# Patient Record
Sex: MALE | Race: OTHER | Employment: OTHER | URBAN - METROPOLITAN AREA
[De-identification: names, ages, dates, MRNs, and addresses within clinical notes are randomized per-mention and may not be internally consistent; named-entity substitution may affect disease eponyms.]

---

## 2021-03-10 ENCOUNTER — HOSPITAL ENCOUNTER (OUTPATIENT)
Dept: RADIOLOGY | Facility: HOSPITAL | Age: 51
Discharge: HOME | End: 2021-03-10
Attending: PHYSICIAN ASSISTANT
Payer: MEDICAID

## 2021-03-10 ENCOUNTER — OFFICE VISIT (OUTPATIENT)
Dept: ORTHOPEDICS | Facility: CLINIC | Age: 51
End: 2021-03-10
Payer: MEDICAID

## 2021-03-10 VITALS
WEIGHT: 199.63 LBS | HEART RATE: 64 BPM | DIASTOLIC BLOOD PRESSURE: 84 MMHG | BODY MASS INDEX: 30.35 KG/M2 | SYSTOLIC BLOOD PRESSURE: 122 MMHG

## 2021-03-10 DIAGNOSIS — M25.512 BILATERAL SHOULDER PAIN, UNSPECIFIED CHRONICITY: ICD-10-CM

## 2021-03-10 DIAGNOSIS — M25.512 BILATERAL SHOULDER PAIN, UNSPECIFIED CHRONICITY: Primary | ICD-10-CM

## 2021-03-10 DIAGNOSIS — M25.511 BILATERAL SHOULDER PAIN, UNSPECIFIED CHRONICITY: Primary | ICD-10-CM

## 2021-03-10 DIAGNOSIS — M25.511 BILATERAL SHOULDER PAIN, UNSPECIFIED CHRONICITY: ICD-10-CM

## 2021-03-10 DIAGNOSIS — M75.41 SUBACROMIAL IMPINGEMENT OF RIGHT SHOULDER: Primary | ICD-10-CM

## 2021-03-10 DIAGNOSIS — M75.42 SUBACROMIAL IMPINGEMENT OF LEFT SHOULDER: ICD-10-CM

## 2021-03-10 PROCEDURE — 73030 X-RAY EXAM OF SHOULDER: CPT | Mod: TC,50,FY

## 2021-03-10 PROCEDURE — 73030 XR SHOULDER COMPLETE 2 OR MORE VIEWS BILATERAL: ICD-10-PCS | Mod: 26,50,, | Performed by: RADIOLOGY

## 2021-03-10 PROCEDURE — 99203 OFFICE O/P NEW LOW 30 MIN: CPT | Mod: PBBFAC,25,PN | Performed by: PHYSICIAN ASSISTANT

## 2021-03-10 PROCEDURE — 20610 DRAIN/INJ JOINT/BURSA W/O US: CPT | Mod: PBBFAC,PN | Performed by: PHYSICIAN ASSISTANT

## 2021-03-10 PROCEDURE — 99999 PR PBB SHADOW E&M-NEW PATIENT-LVL III: CPT | Mod: PBBFAC,,, | Performed by: PHYSICIAN ASSISTANT

## 2021-03-10 PROCEDURE — 73030 X-RAY EXAM OF SHOULDER: CPT | Mod: 26,50,, | Performed by: RADIOLOGY

## 2021-03-10 PROCEDURE — 99204 PR OFFICE/OUTPT VISIT, NEW, LEVL IV, 45-59 MIN: ICD-10-PCS | Mod: S$PBB,25,, | Performed by: PHYSICIAN ASSISTANT

## 2021-03-10 PROCEDURE — 20610 LARGE JOINT ASPIRATION/INJECTION: R SUBACROMIAL BURSA: ICD-10-PCS | Mod: S$PBB,RT,, | Performed by: PHYSICIAN ASSISTANT

## 2021-03-10 PROCEDURE — 99999 PR PBB SHADOW E&M-NEW PATIENT-LVL III: ICD-10-PCS | Mod: PBBFAC,,, | Performed by: PHYSICIAN ASSISTANT

## 2021-03-10 PROCEDURE — 99204 OFFICE O/P NEW MOD 45 MIN: CPT | Mod: S$PBB,25,, | Performed by: PHYSICIAN ASSISTANT

## 2021-03-10 PROCEDURE — 20610 DRAIN/INJ JOINT/BURSA W/O US: CPT | Mod: S$PBB,RT,, | Performed by: PHYSICIAN ASSISTANT

## 2021-03-10 RX ORDER — TRIAMCINOLONE ACETONIDE 40 MG/ML
40 INJECTION, SUSPENSION INTRA-ARTICULAR; INTRAMUSCULAR
Status: DISCONTINUED | OUTPATIENT
Start: 2021-03-10 | End: 2021-03-10 | Stop reason: HOSPADM

## 2021-03-10 RX ADMIN — TRIAMCINOLONE ACETONIDE 40 MG: 40 INJECTION, SUSPENSION INTRA-ARTICULAR; INTRAMUSCULAR at 10:03

## 2021-03-28 ENCOUNTER — IMMUNIZATION (OUTPATIENT)
Dept: INTERNAL MEDICINE | Facility: CLINIC | Age: 51
End: 2021-03-28
Payer: MEDICAID

## 2021-03-28 DIAGNOSIS — Z23 NEED FOR VACCINATION: Primary | ICD-10-CM

## 2021-03-28 PROCEDURE — 0011A COVID-19, MRNA, LNP-S, PF, 100 MCG/0.5 ML DOSE VACCINE: CPT | Mod: PBBFAC,PO

## 2021-04-25 ENCOUNTER — IMMUNIZATION (OUTPATIENT)
Dept: INTERNAL MEDICINE | Facility: CLINIC | Age: 51
End: 2021-04-25
Payer: MEDICAID

## 2021-04-25 DIAGNOSIS — Z23 NEED FOR VACCINATION: Primary | ICD-10-CM

## 2021-04-25 PROCEDURE — 0012A COVID-19, MRNA, LNP-S, PF, 100 MCG/0.5 ML DOSE VACCINE: CPT | Mod: PBBFAC,PO

## 2021-06-28 ENCOUNTER — OFFICE VISIT (OUTPATIENT)
Dept: URGENT CARE | Facility: CLINIC | Age: 51
End: 2021-06-28
Payer: MEDICAID

## 2021-06-28 VITALS
RESPIRATION RATE: 16 BRPM | HEART RATE: 59 BPM | BODY MASS INDEX: 30.16 KG/M2 | WEIGHT: 199 LBS | OXYGEN SATURATION: 97 % | TEMPERATURE: 98 F | HEIGHT: 68 IN | DIASTOLIC BLOOD PRESSURE: 82 MMHG | SYSTOLIC BLOOD PRESSURE: 116 MMHG

## 2021-06-28 DIAGNOSIS — H10.32 ACUTE BACTERIAL CONJUNCTIVITIS OF LEFT EYE: Primary | ICD-10-CM

## 2021-06-28 PROCEDURE — 99213 OFFICE O/P EST LOW 20 MIN: CPT | Mod: S$GLB,,, | Performed by: PHYSICIAN ASSISTANT

## 2021-06-28 PROCEDURE — 99213 PR OFFICE/OUTPT VISIT, EST, LEVL III, 20-29 MIN: ICD-10-PCS | Mod: S$GLB,,, | Performed by: PHYSICIAN ASSISTANT

## 2021-06-28 RX ORDER — POLYMYXIN B SULFATE AND TRIMETHOPRIM 1; 10000 MG/ML; [USP'U]/ML
1 SOLUTION OPHTHALMIC EVERY 6 HOURS
Qty: 10 ML | Refills: 0 | Status: SHIPPED | OUTPATIENT
Start: 2021-06-28 | End: 2021-07-05

## 2021-06-28 RX ORDER — SODIUM, POTASSIUM,MAG SULFATES 17.5-3.13G
SOLUTION, RECONSTITUTED, ORAL ORAL
COMMUNITY
Start: 2021-04-14

## 2021-06-29 ENCOUNTER — CLINICAL SUPPORT (OUTPATIENT)
Dept: URGENT CARE | Facility: CLINIC | Age: 51
End: 2021-06-29
Payer: MEDICAID

## 2021-06-29 DIAGNOSIS — Z11.52 ENCOUNTER FOR SCREENING FOR COVID-19: Primary | ICD-10-CM

## 2021-06-29 LAB
CTP QC/QA: YES
SARS-COV-2 RDRP RESP QL NAA+PROBE: NEGATIVE

## 2021-06-29 PROCEDURE — 87635: ICD-10-PCS | Mod: QW,S$GLB,, | Performed by: PHYSICIAN ASSISTANT

## 2021-06-29 PROCEDURE — 87635 SARS-COV-2 COVID-19 AMP PRB: CPT | Mod: QW,S$GLB,, | Performed by: PHYSICIAN ASSISTANT

## 2021-12-23 ENCOUNTER — HOSPITAL ENCOUNTER (EMERGENCY)
Facility: HOSPITAL | Age: 51
Discharge: HOME | End: 2021-12-23
Attending: EMERGENCY MEDICINE
Payer: MEDICAID

## 2021-12-23 VITALS
OXYGEN SATURATION: 98 % | WEIGHT: 172 LBS | DIASTOLIC BLOOD PRESSURE: 71 MMHG | SYSTOLIC BLOOD PRESSURE: 118 MMHG | TEMPERATURE: 98 F | HEART RATE: 68 BPM | BODY MASS INDEX: 26.15 KG/M2 | RESPIRATION RATE: 18 BRPM
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
## 2021-12-23 DIAGNOSIS — S41.151A DOG BITE OF ARM, RIGHT, INITIAL ENCOUNTER: Primary | ICD-10-CM
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
## 2021-12-23 PROCEDURE — 99284 EMERGENCY DEPT VISIT MOD MDM: CPT | Mod: 25

## 2021-12-23 PROCEDURE — 90715 TDAP VACCINE 7 YRS/> IM: CPT | Performed by: NURSE PRACTITIONER

## 2021-12-23 PROCEDURE — 63600175 PHARM REV CODE 636 W HCPCS: Performed by: NURSE PRACTITIONER

## 2021-12-23 PROCEDURE — 90471 IMMUNIZATION ADMIN: CPT | Performed by: NURSE PRACTITIONER

## 2021-12-23 RX ADMIN — TETANUS TOXOID, REDUCED DIPHTHERIA TOXOID AND ACELLULAR PERTUSSIS VACCINE, ADSORBED 0.5 ML: 5; 2.5; 8; 8; 2.5 SUSPENSION INTRAMUSCULAR at 06:12

## 2021-12-23 NOTE — FIRST PROVIDER EVALUATION
Emergency Department TeleTriage Encounter Note      CHIEF COMPLAINT    Chief Complaint   Patient presents with    Animal Bite     Dog bite noted to right posterior arm, + abrasion noted, last tetanus  was 2005, bleeding controled        VITAL SIGNS   Initial Vitals [12/23/21 1643]   BP Pulse Resp Temp SpO2   118/71 68 18 98 °F (36.7 °C) 98 %      MAP       --            ALLERGIES    Review of patient's allergies indicates:  No Known Allergies    PROVIDER TRIAGE NOTE  TeleTriage Note: Bethany Figueroa, a nontoxic/well appearing, 51 y.o. male, presented to the ED with c/o dog bite to right forearm that happened around 2 hours ago.     All ED beds are full at present; patient notified of this status.  Patient seen and medically screened by Nurse Practitioner via teletriage. Orders initiated at triage to expedite care.  Patient is stable to return to the waiting room and will be placed in an ED bed when available.  Care will be transferred to an alternate provider when patient has been placed in an Exam Room from the lobby for physical exam, additional orders, and disposition.  5:25 PM Madie Gomez DNP, FNP-C        ORDERS  Labs Reviewed - No data to display    ED Orders (720h ago, onward)    Start Ordered     Status Ordering Provider    12/23/21 1730 12/23/21 1726  Tdap (BOOSTRIX) vaccine injection 0.5 mL  ED 1 Time         Ordered MADIE GOMEZ    12/23/21 1727 12/23/21 1726  X-Ray Elbow Complete Right  1 time imaging         Ordered MADIE GOMEZ            Virtual Visit Note: The provider triage portion of this emergency department evaluation and documentation was performed via [employer], a HIPAA-compliant telemedicine application, in concert with a tele-presenter in the room. A face to face patient evaluation with one of my colleagues will occur once the patient is placed in an emergency department room.      DISCLAIMER: This note was prepared with M*Modal voice recognition transcription  software. Garbled syntax, mangled pronouns, and other bizarre constructions may be attributed to that software system.

## 2021-12-24 NOTE — ED PROVIDER NOTES
Encounter Date: 12/23/2021    SCRIBE #1 NOTE: I, Ruby Boston , am scribing for, and in the presence of, Shaylee Puri MD.       History     Chief Complaint   Patient presents with    Animal Bite     Dog bite noted to right posterior arm, + abrasion noted, last tetanus  was 2005, bleeding controled      51-year-old male presents to the ED due to a dog bite.  Patient reports he was fixing a fence at a client's house when their domestic dog ran after him and bit him on the right posterior arm near his elbow. He notes he was able to to escape. Patient reports the dog's owners verbally mentioned the dog was up-to-date on vaccinations but did not show any documentation to prove it. Patient notes he has received his Tetanus vaccination.         The history is provided by the patient.     Review of patient's allergies indicates:  No Known Allergies  History reviewed. No pertinent past medical history.  No past surgical history on file.  No family history on file.  Social History     Tobacco Use    Smoking status: Former Smoker     Types: Cigarettes   Substance Use Topics    Alcohol use: Never    Drug use: Never     Review of Systems   Constitutional: Negative for fever.   HENT: Negative for sore throat.    Respiratory: Negative for shortness of breath.    Cardiovascular: Negative for chest pain.   Gastrointestinal: Negative for nausea.   Genitourinary: Negative for dysuria.   Musculoskeletal: Negative for back pain.   Skin: Positive for wound (right arm). Negative for rash.   Neurological: Negative for weakness.   Hematological: Does not bruise/bleed easily.       Physical Exam     Initial Vitals [12/23/21 1643]   BP Pulse Resp Temp SpO2   118/71 68 18 98 °F (36.7 °C) 98 %      MAP       --         Physical Exam    Nursing note and vitals reviewed.  Constitutional: He appears well-developed and well-nourished.   HENT:   Head: Normocephalic and atraumatic.   Neck: Neck supple.   Normal range of motion.  Pulmonary/Chest:  Breath sounds normal.   Musculoskeletal:         General: No edema. Normal range of motion.      Cervical back: Normal range of motion and neck supple.     Neurological: He is alert and oriented to person, place, and time.   Skin: Skin is warm and dry.        Psychiatric: He has a normal mood and affect. His behavior is normal. Judgment and thought content normal.         ED Course   Procedures  Labs Reviewed - No data to display       Imaging Results          X-Ray Elbow Complete Right (Final result)  Result time 12/23/21 17:53:28    Final result by Alejandro Hernandez MD (12/23/21 17:53:28)                 Impression:      Negative right elbow.      Electronically signed by: Alejandro Hernandez  Date:    12/23/2021  Time:    17:53             Narrative:    EXAMINATION:  XR ELBOW COMPLETE 3 VIEW RIGHT    CLINICAL HISTORY:  . Bitten by dog, initial encounter    TECHNIQUE:  AP, lateral, and oblique views of the right elbow were performed.    COMPARISON:  None    FINDINGS:  Bones, joint spaces and soft tissues appear intact without evidence of fracture, foreign body or effusion.                                 Medications   Tdap (BOOSTRIX) vaccine injection 0.5 mL (0.5 mLs Intramuscular Given 12/23/21 1826)     Medical Decision Making:   Clinical Tests:   Radiological Study: Ordered and Reviewed          Scribe Attestation:   Scribe #1: I performed the above scribed service and the documentation accurately describes the services I performed. I attest to the accuracy of the note.        ED Course as of 12/23/21 2024   Thu Dec 23, 2021   2022 The patient was given rabies vaccine instructions to read.  He decided that he does not want to start the rabies vaccine tonight but he would like to see if the dog has been vaccinated.  He was informed that he can return tomorrow. [ST]      ED Course User Index  [ST] Shaylee Puri MD             Clinical Impression:   Final diagnoses:  [W54.0XXA] Dog bite  [S41.151A, W54.0XXA] Dog  bite of arm, right, initial encounter (Primary)          ED Disposition Condition    Discharge Stable        ED Prescriptions     None        Follow-up Information     Follow up With Specialties Details Why Contact Info    Adrián Rivera MD Family Medicine Schedule an appointment as soon as possible for a visit   [address]  [facility] [address]  [phone]      [facility] Emergency Dept Emergency Medicine  If you find that the dog has had no vaccinations and you would like your rabies vaccination [address] [facility] [address] [phone]           I, Shaylee Puri, personally performed the services described in this documentation. All medical record entries made by the scribe were at my direction and in my presence.  I have reviewed the chart and agree that the record reflects my personal performance and is accurate and complete. Shaylee Puri M.D. 8:24 PM12/23/2021     Shaylee Puri MD  12/23/21 2024

## 2021-12-24 NOTE — ED NOTES
Pt presents to the ED c/o superficial dog bite to Rt lateral elbow    APPEARANCE: Alert, oriented and in no acute distress.  HEENT: Speaks without hoarseness.  CARDIAC: Normal rate and rhythm.    PERIPHERAL VASCULAR: peripheral pulses present. Normal cap refill. No edema. Warm to touch.    RESPIRATORY:Normal rate and effort. Respirations are equal and unlabored no obvious signs of distress.  GASTRO: soft, nondistended, nontender. Denies nausea, vomiting, or diarrhea.  : voids spontaneously and without difficulty.   MUSC: Full ROM. No obvious deformity. Ambulatory with a steady gait  SKIN: Skin is warm and dry, with bruising and abrasion. Mucous membranes moist.  NEURO: Pt is awake, alert, aware of environment. No neurologic deficits noted.

## 2021-12-26 ENCOUNTER — HOSPITAL ENCOUNTER (EMERGENCY)
Facility: HOSPITAL | Age: 51
Discharge: HOME | End: 2021-12-26
Attending: EMERGENCY MEDICINE
Payer: MEDICAID

## 2021-12-26 VITALS
RESPIRATION RATE: 16 BRPM | OXYGEN SATURATION: 100 % | TEMPERATURE: 99 F | SYSTOLIC BLOOD PRESSURE: 115 MMHG | HEART RATE: 73 BPM | DIASTOLIC BLOOD PRESSURE: 74 MMHG
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
## 2021-12-26 PROCEDURE — 63600175 PHARM REV CODE 636 W HCPCS: Mod: JG | Performed by: EMERGENCY MEDICINE

## 2021-12-26 PROCEDURE — 90675 RABIES VACCINE IM: CPT | Mod: JG | Performed by: EMERGENCY MEDICINE

## 2021-12-26 PROCEDURE — 99284 EMERGENCY DEPT VISIT MOD MDM: CPT | Mod: 25

## 2021-12-26 PROCEDURE — 90471 IMMUNIZATION ADMIN: CPT | Performed by: EMERGENCY MEDICINE

## 2021-12-26 PROCEDURE — 90471 IMMUNIZATION ADMIN: CPT

## 2021-12-26 RX ORDER — AMOXICILLIN AND CLAVULANATE POTASSIUM 875; 125 MG/1; MG/1
1 TABLET, FILM COATED ORAL 2 TIMES DAILY
Qty: 14 TABLET | Refills: 0 | Status: SHIPPED | OUTPATIENT
Start: 2021-12-26 | End: 2023-02-08

## 2021-12-26 RX ADMIN — RABIES VACCINE 2.5 UNITS: KIT at 01:12

## 2021-12-26 NOTE — ED PROVIDER NOTES
Encounter Date: 12/26/2021    SCRIBE #1 NOTE: I, Nasim Danielson am scribing for, and in the presence of, Td Bansal MD.       History     Chief Complaint   Patient presents with    rabies shot     Pt received dog bite  occurred 12/23, request rabies shot      Bethany Figueroa is a 51 y.o. male with no past medical history on file who presents to the ED for Rabies Shot.   The patient reports being bitten on the right arm by a neighbor's Rottweiler on 12/23. He reports being seen in the ED following the incident and received a tetanus vaccine but was not placed on antibiotics. He states that the dog's owners were unable to provide documentation that the dog has received all of his immunizations and is requesting a rabies vaccine. He denies fever or other associated symptoms.    The history is provided by the patient.     Review of patient's allergies indicates:  No Known Allergies  No past medical history on file.  No past surgical history on file.  No family history on file.  Social History     Tobacco Use    Smoking status: Former Smoker     Types: Cigarettes   Substance Use Topics    Alcohol use: Never    Drug use: Never     Review of Systems   Constitutional: Negative for chills and fever.   HENT: Negative for sore throat.    Eyes: Negative for redness.   Respiratory: Negative for shortness of breath.    Cardiovascular: Negative for chest pain.   Gastrointestinal: Negative for abdominal pain, diarrhea, nausea and vomiting.   Genitourinary: Negative for dysuria and hematuria.   Musculoskeletal: Negative for back pain.   Skin: Positive for wound (dog bite to Right arm). Negative for rash.   Neurological: Negative for headaches.   All other systems reviewed and are negative.      Physical Exam     Initial Vitals [12/26/21 1130]   BP Pulse Resp Temp SpO2   110/65 79 18 98.1 °F (36.7 °C) 99 %      MAP       --         Physical Exam    Nursing note and vitals reviewed.  Constitutional: He appears  well-developed and well-nourished. He is not diaphoretic. No distress.   HENT:   Head: Normocephalic and atraumatic.   Mouth/Throat: Oropharynx is clear and moist.   Eyes: Conjunctivae are normal.   Cardiovascular: Normal rate and regular rhythm.   Pulmonary/Chest: Breath sounds normal. No respiratory distress.     Neurological: He is alert and oriented to person, place, and time.   Skin: Skin is warm and dry. Capillary refill takes less than 2 seconds. Abrasion noted. No rash noted. There is erythema. No pallor.   Linear abrasions with 3 puncture wounds noted proximal to right elbow. Mild surrounding erythema noted. No discharge or bleeding noted.   Psychiatric: He has a normal mood and affect.         ED Course   Procedures  Labs Reviewed - No data to display       Imaging Results    None          Medications   rabies vaccine, PCEC injection 2.5 Units (2.5 Units Intramuscular Given 12/26/21 1330)     Medical Decision Making:   Initial Assessment:   The patient is a 51 y.o. male who presents to the ED for a rabies shot.   ED Management:  Patient was given his 1st rabies shot here in the ED.  He will return in 1 week for his 2nd shot.  He will also be placed on Augmentin.  He may return to the ED sooner for any developing signs of infection.          Scribe Attestation:   Scribe #1: I performed the above scribed service and the documentation accurately describes the services I performed. I attest to the accuracy of the note.                 Clinical Impression:   Final diagnoses:  [W54.0XXD] Dog bite, subsequent encounter (Primary)          ED Disposition Condition    Discharge Stable       I, Dr. Td Douglas, personally performed the services described in this documentation. All medical record entries made by the scribe were at my direction and in my presence. I have reviewed the chart and agree that the record reflects my personal performance and is accurate and complete. Td Douglas MD.  5:32 PM  12/28/2021    ED Prescriptions     Medication Sig Dispense Start Date End Date Auth. Provider    amoxicillin-clavulanate 875-125mg (AUGMENTIN) 875-125 mg per tablet Take 1 tablet by mouth 2 (two) times daily. 14 tablet 12/26/2021  Td Bansal MD        Follow-up Information     Follow up With Specialties Details Why Contact Info    [facility] - Emergency Dept Emergency Medicine In 1 week for second injection [address] [facility] [address] [phone]           Td Bansal MD  12/28/21 3519

## 2021-12-27 ENCOUNTER — NURSE TRIAGE (OUTPATIENT)
Dept: ADMINISTRATIVE | Facility: CLINIC | Age: 51
End: 2021-12-27
Payer: MEDICAID

## 2022-01-02 ENCOUNTER — HOSPITAL ENCOUNTER (EMERGENCY)
Facility: HOSPITAL | Age: 52
Discharge: HOME | End: 2022-01-02
Attending: EMERGENCY MEDICINE
Payer: MEDICAID

## 2022-01-02 VITALS
HEART RATE: 60 BPM | OXYGEN SATURATION: 95 % | TEMPERATURE: 97 F | SYSTOLIC BLOOD PRESSURE: 145 MMHG | DIASTOLIC BLOOD PRESSURE: 76 MMHG | RESPIRATION RATE: 20 BRPM

## 2022-01-02 DIAGNOSIS — Z20.3 NEED FOR POST EXPOSURE PROPHYLAXIS FOR RABIES: Primary | ICD-10-CM

## 2022-01-02 LAB
CTP QC/QA: YES
SARS-COV-2 RDRP RESP QL NAA+PROBE: NEGATIVE

## 2022-01-02 PROCEDURE — 90471 IMMUNIZATION ADMIN: CPT | Performed by: EMERGENCY MEDICINE

## 2022-01-02 PROCEDURE — 90675 RABIES VACCINE IM: CPT | Mod: JG | Performed by: EMERGENCY MEDICINE

## 2022-01-02 PROCEDURE — U0002 COVID-19 LAB TEST NON-CDC: HCPCS | Performed by: EMERGENCY MEDICINE

## 2022-01-02 PROCEDURE — 99284 EMERGENCY DEPT VISIT MOD MDM: CPT | Mod: 25

## 2022-01-02 PROCEDURE — 63600175 PHARM REV CODE 636 W HCPCS: Mod: JG | Performed by: EMERGENCY MEDICINE

## 2022-01-02 RX ADMIN — RABIES VACCINE 2.5 UNITS: KIT at 06:01

## 2022-01-02 NOTE — DISCHARGE INSTRUCTIONS
COME TO THE EMERGENCY DEPARTMENT FOR YOUR NEXT RABIES SHOTS ON:    JANUARY 10TH AFTER 5 PM. CHECK IN AT THE REGISTRATION DESK AND ASK THE  TO NOTIFY DR. KHAN THAT YOU ARE HERE.     JANUARY 16TH AFTER 5 PM. CHECK IN AT THE REGISTRATION DESK AND ASK THE  TO NOTIFY DR. KHAN THAT YOU ARE HERE.

## 2022-01-02 NOTE — ED PROVIDER NOTES
Encounter Date: 1/2/2022       History     Chief Complaint   Patient presents with    rabies shot     Request 2nd rabies shot      The patient is a 51-year-old male who was bitten by a dog 10 days ago..  It was a next door neighbors rottweiler.  The family was unable to produce the rabies vaccination records so the patient came in 3 days after the dog bite to start his rabies vaccinations.  He had his 1st vaccination at that time.  He was given Augmentin which have made him nauseated and he has only taken 1 dose however he states his arm has healed.        Review of patient's allergies indicates:  No Known Allergies  No past medical history on file.  No past surgical history on file.  No family history on file.  Social History     Tobacco Use    Smoking status: Former Smoker     Types: Cigarettes   Substance Use Topics    Alcohol use: Never    Drug use: Never     Review of Systems   Constitutional: Negative for activity change, appetite change and fever.   Skin: Positive for wound (Wound is healing).       Physical Exam     Initial Vitals [01/02/22 1514]   BP Pulse Resp Temp SpO2   125/67 74 18 98.7 °F (37.1 °C) 99 %      MAP       --         Physical Exam    Nursing note and vitals reviewed.  Constitutional: He appears well-developed and well-nourished.     Neurological: He is alert and oriented to person, place, and time.   Skin: Skin is warm and dry.   Wound to right upper arm is is scarred over with no evidence of infection         ED Course   Procedures  Labs Reviewed   SARS-COV-2 RDRP GENE          Imaging Results    None          Medications   rabies vaccine, PCEC injection 2.5 Units (has no administration in time range)                 ED Course as of 01/02/22 1736   Sun Jan 02, 2022   1734 The patient is out of rotation for his rabies vaccinations.  He has received day 1 of his immunization but did not receive day 3 of his immunizations.  Today would be his day 7 immunization but I feel that he should  get two more vaccinations to complete the course. I will tell him to come back in 7 days and then in another 7 days for 4 shots total. [ST]   8442 COVID test was ordered by me on the wrong patient. It was sent to the lab and will be run.  [ST]      ED Course User Index  [ST] Shaylee Puri MD             Clinical Impression:   Final diagnoses:  [Z20.3] Need for post exposure prophylaxis for rabies (Primary)          ED Disposition Condition    Discharge Stable        ED Prescriptions     None        Follow-up Information     Follow up With Specialties Details Why Contact Info    Adrián Rivera MD Family Medicine In 1 week  [address] [facility] [address]  [phone]             Shaylee Puri MD  01/02/22 7109

## 2022-01-03 ENCOUNTER — PATIENT MESSAGE (OUTPATIENT)
Dept: ADMINISTRATIVE | Facility: OTHER | Age: 52
End: 2022-01-03
Payer: MEDICAID

## 2022-01-10 ENCOUNTER — HOSPITAL ENCOUNTER (EMERGENCY)
Facility: HOSPITAL | Age: 52
Discharge: HOME | End: 2022-01-10
Attending: EMERGENCY MEDICINE
Payer: MEDICAID

## 2022-01-10 VITALS
TEMPERATURE: 98 F | OXYGEN SATURATION: 100 % | SYSTOLIC BLOOD PRESSURE: 120 MMHG | HEART RATE: 100 BPM | RESPIRATION RATE: 18 BRPM | DIASTOLIC BLOOD PRESSURE: 78 MMHG

## 2022-01-10 DIAGNOSIS — Z23 NEED FOR RABIES VACCINATION: Primary | ICD-10-CM

## 2022-01-10 PROCEDURE — 63600175 PHARM REV CODE 636 W HCPCS: Mod: JG | Performed by: EMERGENCY MEDICINE

## 2022-01-10 PROCEDURE — 99900062 HC AFTER HR RABIES VACCINE ED REGISTRATION

## 2022-01-10 PROCEDURE — 90471 IMMUNIZATION ADMIN: CPT | Performed by: EMERGENCY MEDICINE

## 2022-01-10 PROCEDURE — 99999 HC NO LEVEL OF SERVICE - ED ONLY: CPT

## 2022-01-10 PROCEDURE — 90675 RABIES VACCINE IM: CPT | Mod: JG | Performed by: EMERGENCY MEDICINE

## 2022-01-10 RX ADMIN — RABIES VACCINE 2.5 UNITS: KIT at 05:01

## 2022-01-10 NOTE — FIRST PROVIDER EVALUATION
Emergency Department TeleTriage Encounter Note      CHIEF COMPLAINT    Chief Complaint   Patient presents with    rabies injection     Patient is returning for third injection of rabies.        VITAL SIGNS   Initial Vitals [01/10/22 1658]   BP Pulse Resp Temp SpO2   122/87 106 20 98 °F (36.7 °C) 100 %      MAP       --            ALLERGIES    Review of patient's allergies indicates:  No Known Allergies    PROVIDER TRIAGE NOTE  This is a teletriage evaluation of a 51 y.o. male presenting to the ED complaining of need for 3rd rabies vaccine.     Initial orders will be placed and care will be transferred to an alternate provider when patient is roomed for a full evaluation. Any additional orders and the final disposition will be determined by that provider.           ORDERS  Labs Reviewed - No data to display    ED Orders (720h ago, onward)    Start Ordered     Status Ordering Provider    01/10/22 1800 01/10/22 1700  rabies vaccine, PCEC injection 2.5 Units  Once         Ordered YARON DONAHUE    01/10/22 1715 01/10/22 1704  rabies vaccine,human diploid injection 2.5 Units  ED 1 Time         Ordered DIANE OCHOA            Virtual Visit Note: The provider triage portion of this emergency department evaluation and documentation was performed via [employer], a HIPAA-compliant telemedicine application, in concert with a tele-presenter in the room. A face to face patient evaluation with one of my colleagues will occur once the patient is placed in an emergency department room.      DISCLAIMER: This note was prepared with [employer] voice recognition transcription software. Garbled syntax, mangled pronouns, and other bizarre constructions may be attributed to that software system.

## 2022-01-11 NOTE — ED NOTES
Pt was bit by a dog and presents for his 3rd rabies shot. Pt reports no difficulty with the first 2. Pt aaox4 with no distress.

## 2022-01-11 NOTE — DISCHARGE INSTRUCTIONS
The patient's instructions from last week include instructions for today. He will return for 1 final shot next week.

## 2022-01-11 NOTE — ED PROVIDER NOTES
Encounter Date: 1/10/2022       History     Chief Complaint   Patient presents with    rabies injection     Patient is returning for third injection of rabies.      Patient is a 51-year-old male who presents to the emergency department for his 3rd out of 4 rabies vaccinations.  The patient was bitten by a neighborhood dog.        Review of patient's allergies indicates:  No Known Allergies  No past medical history on file.  No past surgical history on file.  No family history on file.  Social History     Tobacco Use    Smoking status: Former Smoker     Types: Cigarettes   Substance Use Topics    Alcohol use: Never    Drug use: Never     Review of Systems   Constitutional: Negative for activity change, appetite change and fever.       Physical Exam     Initial Vitals [01/10/22 1658]   BP Pulse Resp Temp SpO2   122/87 106 20 98 °F (36.7 °C) 100 %      MAP       --         Physical Exam    Nursing note and vitals reviewed.  Constitutional: He appears well-developed and well-nourished.     Skin: Skin is warm and dry.   Bite to right upper arm as well healed         ED Course   Procedures  Labs Reviewed - No data to display       Imaging Results    None          Medications   rabies vaccine, PCEC injection 2.5 Units (2.5 Units Intramuscular Given 1/10/22 1740)                          Clinical Impression:   Final diagnoses:  [Z23] Need for rabies vaccination (Primary)          ED Disposition Condition    Discharge Stable        ED Prescriptions     None        Follow-up Information    None          Shaylee Puri MD  01/10/22 3446

## 2022-01-16 ENCOUNTER — HOSPITAL ENCOUNTER (EMERGENCY)
Facility: HOSPITAL | Age: 52
Discharge: HOME | End: 2022-01-16
Attending: EMERGENCY MEDICINE
Payer: MEDICAID

## 2022-01-16 VITALS
OXYGEN SATURATION: 95 % | DIASTOLIC BLOOD PRESSURE: 81 MMHG | WEIGHT: 173 LBS | SYSTOLIC BLOOD PRESSURE: 121 MMHG | RESPIRATION RATE: 17 BRPM | HEART RATE: 90 BPM | TEMPERATURE: 98 F | BODY MASS INDEX: 26.3 KG/M2

## 2022-01-16 DIAGNOSIS — Z23 ENCOUNTER FOR REPEAT ADMINISTRATION OF RABIES VACCINATION: Primary | ICD-10-CM

## 2022-01-16 PROCEDURE — 90471 IMMUNIZATION ADMIN: CPT | Performed by: EMERGENCY MEDICINE

## 2022-01-16 PROCEDURE — 63600175 PHARM REV CODE 636 W HCPCS: Mod: JG | Performed by: EMERGENCY MEDICINE

## 2022-01-16 PROCEDURE — 99999 HC NO LEVEL OF SERVICE - ED ONLY: CPT

## 2022-01-16 PROCEDURE — 90675 RABIES VACCINE IM: CPT | Mod: JG | Performed by: EMERGENCY MEDICINE

## 2022-01-16 PROCEDURE — 99900062 HC AFTER HR RABIES VACCINE ED REGISTRATION

## 2022-01-16 RX ADMIN — Medication 2.5 UNITS: at 09:01

## 2022-01-17 NOTE — ED PROVIDER NOTES
Encounter Date: 1/16/2022       History     Chief Complaint   Patient presents with    rabies vaccine     Pt arrives for his 5th rabies vaccine.      The patient presents to the emergency department for his 4th and final rabies vaccine.  Bite wound is well-healed.  Patient denies any other complaints        Review of patient's allergies indicates:  No Known Allergies  No past medical history on file.  No past surgical history on file.  No family history on file.  Social History     Tobacco Use    Smoking status: Former Smoker     Types: Cigarettes   Substance Use Topics    Alcohol use: Never    Drug use: Never     Review of Systems   Constitutional: Negative for activity change, appetite change and fever.       Physical Exam     Initial Vitals [01/16/22 2032]   BP Pulse Resp Temp SpO2   121/81 90 17 98.3 °F (36.8 °C) 95 %      MAP       --         Physical Exam    Nursing note and vitals reviewed.  Constitutional: He appears well-developed and well-nourished.     Neurological: He is alert and oriented to person, place, and time.   Psychiatric: He has a normal mood and affect. His behavior is normal. Judgment and thought content normal.         ED Course   Procedures  Labs Reviewed - No data to display       Imaging Results    None          Medications   rabies vaccine, PCEC injection 2.5 Units (has no administration in time range)                          Clinical Impression:   Final diagnoses:  [Z23] Encounter for repeat administration of rabies vaccination (Primary)          ED Disposition Condition    Discharge Stable        ED Prescriptions     None        Follow-up Information     Follow up With Specialties Details Why Contact Info    Adrián Rivera MD Family Medicine  As needed [address] [facility] [address]  [phone]             Shaylee Puri MD  01/16/22 2059

## 2023-02-08 ENCOUNTER — OFFICE VISIT (OUTPATIENT)
Dept: URGENT CARE | Facility: CLINIC | Age: 53
End: 2023-02-08
Payer: MEDICAID

## 2023-02-08 VITALS
WEIGHT: 173 LBS | TEMPERATURE: 99 F | BODY MASS INDEX: 26.22 KG/M2 | HEART RATE: 73 BPM | DIASTOLIC BLOOD PRESSURE: 85 MMHG | HEIGHT: 68 IN | OXYGEN SATURATION: 97 % | RESPIRATION RATE: 19 BRPM | SYSTOLIC BLOOD PRESSURE: 120 MMHG

## 2023-02-08 DIAGNOSIS — R05.9 COUGH, UNSPECIFIED TYPE: ICD-10-CM

## 2023-02-08 DIAGNOSIS — J06.9 VIRAL URI: Primary | ICD-10-CM

## 2023-02-08 LAB
CTP QC/QA: YES
CTP QC/QA: YES
POC MOLECULAR INFLUENZA A AGN: NEGATIVE
POC MOLECULAR INFLUENZA B AGN: NEGATIVE
SARS-COV-2 AG RESP QL IA.RAPID: NEGATIVE

## 2023-02-08 PROCEDURE — 1159F PR MEDICATION LIST DOCUMENTED IN MEDICAL RECORD: ICD-10-PCS | Mod: CPTII,S$GLB,,

## 2023-02-08 PROCEDURE — 3079F DIAST BP 80-89 MM HG: CPT | Mod: CPTII,S$GLB,,

## 2023-02-08 PROCEDURE — 87811 SARS CORONAVIRUS 2 ANTIGEN POCT, MANUAL READ: ICD-10-PCS | Mod: QW,S$GLB,,

## 2023-02-08 PROCEDURE — 1160F PR REVIEW ALL MEDS BY PRESCRIBER/CLIN PHARMACIST DOCUMENTED: ICD-10-PCS | Mod: CPTII,S$GLB,,

## 2023-02-08 PROCEDURE — 3074F SYST BP LT 130 MM HG: CPT | Mod: CPTII,S$GLB,,

## 2023-02-08 PROCEDURE — 87502 INFLUENZA DNA AMP PROBE: CPT | Mod: QW,S$GLB,,

## 2023-02-08 PROCEDURE — 87811 SARS-COV-2 COVID19 W/OPTIC: CPT | Mod: QW,S$GLB,,

## 2023-02-08 PROCEDURE — 99213 OFFICE O/P EST LOW 20 MIN: CPT | Mod: S$GLB,,,

## 2023-02-08 PROCEDURE — 3008F PR BODY MASS INDEX (BMI) DOCUMENTED: ICD-10-PCS | Mod: CPTII,S$GLB,,

## 2023-02-08 PROCEDURE — 99213 PR OFFICE/OUTPT VISIT, EST, LEVL III, 20-29 MIN: ICD-10-PCS | Mod: S$GLB,,,

## 2023-02-08 PROCEDURE — 1159F MED LIST DOCD IN RCRD: CPT | Mod: CPTII,S$GLB,,

## 2023-02-08 PROCEDURE — 3079F PR MOST RECENT DIASTOLIC BLOOD PRESSURE 80-89 MM HG: ICD-10-PCS | Mod: CPTII,S$GLB,,

## 2023-02-08 PROCEDURE — 87502 POCT INFLUENZA A/B MOLECULAR: ICD-10-PCS | Mod: QW,S$GLB,,

## 2023-02-08 PROCEDURE — 3008F BODY MASS INDEX DOCD: CPT | Mod: CPTII,S$GLB,,

## 2023-02-08 PROCEDURE — 1160F RVW MEDS BY RX/DR IN RCRD: CPT | Mod: CPTII,S$GLB,,

## 2023-02-08 PROCEDURE — 3074F PR MOST RECENT SYSTOLIC BLOOD PRESSURE < 130 MM HG: ICD-10-PCS | Mod: CPTII,S$GLB,,

## 2023-02-08 RX ORDER — METHYLPREDNISOLONE 4 MG/1
TABLET ORAL
COMMUNITY
Start: 2022-09-13

## 2023-02-08 RX ORDER — PROMETHAZINE HYDROCHLORIDE AND DEXTROMETHORPHAN HYDROBROMIDE 6.25; 15 MG/5ML; MG/5ML
5 SYRUP ORAL NIGHTLY PRN
Qty: 118 ML | Refills: 0 | Status: SHIPPED | OUTPATIENT
Start: 2023-02-08 | End: 2023-02-18

## 2023-02-08 RX ORDER — FLUTICASONE PROPIONATE 50 MCG
1 SPRAY, SUSPENSION (ML) NASAL DAILY
Qty: 9.9 ML | Refills: 0 | Status: SHIPPED | OUTPATIENT
Start: 2023-02-08

## 2023-02-08 RX ORDER — BENZONATATE 100 MG/1
100 CAPSULE ORAL 3 TIMES DAILY PRN
Qty: 30 CAPSULE | Refills: 0 | Status: SHIPPED | OUTPATIENT
Start: 2023-02-08 | End: 2023-02-18

## 2023-02-08 NOTE — PROGRESS NOTES
"Subjective:       Patient ID: Bethany Figueroa is a 52 y.o. male.    Vitals:  height is 5' 8" (1.727 m) and weight is 78.5 kg (173 lb). His temperature is 98.8 °F (37.1 °C). His blood pressure is 120/85 and his pulse is 73. His respiration is 19 and oxygen saturation is 97%.     Chief Complaint: Cough and Sinus Problem    Pt present with symptoms of cough, headache, sneezing and sinus pressure X 4 days.     Cough  This is a new problem. The current episode started in the past 7 days. The problem has been gradually worsening. The problem occurs constantly. The cough is Non-productive. Associated symptoms include headaches and nasal congestion. Pertinent negatives include no chest pain, chills, ear congestion, ear pain, fever, sore throat, shortness of breath or wheezing. Nothing aggravates the symptoms. He has tried OTC cough suppressant for the symptoms. The treatment provided no relief. There is no history of asthma or bronchitis.     Constitution: Negative for chills and fever.   HENT:  Negative for ear pain and sore throat.    Cardiovascular:  Negative for chest pain.   Respiratory:  Positive for cough. Negative for shortness of breath and wheezing.    Neurological:  Positive for headaches. Negative for dizziness and light-headedness.     Objective:      Physical Exam   Constitutional: He is oriented to person, place, and time. He appears well-developed. He is cooperative.  Non-toxic appearance. He does not appear ill. No distress.   HENT:   Head: Normocephalic and atraumatic.   Ears:   Right Ear: Hearing, tympanic membrane, external ear and ear canal normal.   Left Ear: Hearing, tympanic membrane, external ear and ear canal normal.   Nose: Congestion present. No mucosal edema, rhinorrhea or nasal deformity. No epistaxis. Right sinus exhibits no maxillary sinus tenderness and no frontal sinus tenderness. Left sinus exhibits no maxillary sinus tenderness and no frontal sinus tenderness.   Mouth/Throat: Uvula is " midline and mucous membranes are normal. No trismus in the jaw. Normal dentition. No uvula swelling. Posterior oropharyngeal erythema present. No oropharyngeal exudate or posterior oropharyngeal edema.   Eyes: Conjunctivae and lids are normal. No scleral icterus.   Neck: Trachea normal and phonation normal. Neck supple. No edema present. No erythema present. No neck rigidity present.   Cardiovascular: Normal rate, regular rhythm, normal heart sounds and normal pulses.   Pulmonary/Chest: Effort normal and breath sounds normal. No respiratory distress. He has no decreased breath sounds. He has no rhonchi.   Abdominal: Normal appearance.   Musculoskeletal: Normal range of motion.         General: No deformity. Normal range of motion.   Neurological: He is alert and oriented to person, place, and time. He exhibits normal muscle tone. Coordination normal.   Skin: Skin is warm, dry, intact, not diaphoretic and not pale.   Psychiatric: His speech is normal and behavior is normal. Judgment and thought content normal.   Nursing note and vitals reviewed.      Results for orders placed or performed in visit on 02/08/23   SARS Coronavirus 2 Antigen, POCT Manual Read   Result Value Ref Range    SARS Coronavirus 2 Antigen Negative Negative     Acceptable Yes    POCT Influenza A/B Molecular   Result Value Ref Range    POC Molecular Influenza A Ag Negative Negative, Not Reported    POC Molecular Influenza B Ag Negative Negative, Not Reported     Acceptable Yes        Assessment:       1. Viral URI    2. Cough, unspecified type          Plan:         Viral URI  -     fluticasone propionate (FLONASE) 50 mcg/actuation nasal spray; 1 spray (50 mcg total) by Each Nostril route once daily.  Dispense: 9.9 mL; Refill: 0  -     benzonatate (TESSALON) 100 MG capsule; Take 1 capsule (100 mg total) by mouth 3 (three) times daily as needed for Cough.  Dispense: 30 capsule; Refill: 0  -      promethazine-dextromethorphan (PROMETHAZINE-DM) 6.25-15 mg/5 mL Syrp; Take 5 mLs by mouth nightly as needed (cough).  Dispense: 118 mL; Refill: 0    Cough, unspecified type  -     SARS Coronavirus 2 Antigen, POCT Manual Read  -     POCT Influenza A/B Molecular      Patient Instructions     Patient Instructions   PLEASE READ YOUR DISCHARGE INSTRUCTIONS ENTIRELY AS IT CONTAINS IMPORTANT INFORMATION.     Please drink plenty of fluids.     Please get plenty of rest.     Please return here or go to the Emergency Department for any concerns or worsening of condition.     Please take an over the counter antihistamine medication (allegra/Claritin/Zyrtec) of your choice as directed.     Try an over the counter decongestant like Mucinex D or Sudafed. You buy this behind the pharmacy counter     If you do have Hypertension or palpitations, it is safe to take Coricidin HBP for relief of sinus symptoms.     If not allergic, please take over the counter Tylenol (Acetaminophen) and/or Motrin (Ibuprofen) as directed for control of pain and/or fever.  Please follow up with your primary care doctor or specialist as needed.     Sore throat recommendations: Warm fluids, warm salt water gargles, throat lozenges, tea, honey, soup, rest, hydration.     Use over the counter flonase: one spray each nostril twice daily OR two sprays each nostril once daily.      If you  smoke, please stop smoking.     Please return or see your primary care doctor if you develop new or worsening symptoms.      Please arrange follow up with your primary medical clinic as soon as possible. You must understand that you've received an Urgent Care treatment only and that you may be released before all of your medical problems are known or treated. You, the patient, will arrange for follow up as instructed. If your symptoms worsen or fail to improve you should go to the Emergency Room.

## 2023-02-08 NOTE — LETTER
February 8, 2023      [facility]  [address] [address]  Phone: [phone]  Fax: [phone]       Patient: Bethany Figueroa   YOB: 1970  Date of Visit: 02/08/2023    To Whom It May Concern:    Reggie Figueroa  was at [facility] on 02/08/2023. The patient may return to work/school on 2/9/23 with no restrictions. Pt has viral URI. Tested negative for COVID and influenza. If you have any questions or concerns, or if I can be of further assistance, please do not hesitate to contact me.    Sincerely,    Dorinda Garcia, NP